# Patient Record
Sex: FEMALE | URBAN - METROPOLITAN AREA
[De-identification: names, ages, dates, MRNs, and addresses within clinical notes are randomized per-mention and may not be internally consistent; named-entity substitution may affect disease eponyms.]

---

## 2019-02-12 ENCOUNTER — HOSPITAL ENCOUNTER (OUTPATIENT)
Dept: LAB | Age: 69
Discharge: HOME | End: 2019-02-12
Payer: MEDICARE

## 2019-02-12 PROCEDURE — 88305 TISSUE EXAM BY PATHOLOGIST: CPT

## 2022-12-07 ENCOUNTER — OFFICE VISIT (OUTPATIENT)
Dept: HEMATOLOGY | Age: 72
End: 2022-12-07
Payer: MEDICARE

## 2022-12-07 ENCOUNTER — HOSPITAL ENCOUNTER (OUTPATIENT)
Dept: LAB | Age: 72
Discharge: HOME | End: 2022-12-07
Payer: MEDICARE

## 2022-12-07 VITALS
HEART RATE: 92 BPM | BODY MASS INDEX: 41.32 KG/M2 | WEIGHT: 263.25 LBS | DIASTOLIC BLOOD PRESSURE: 105 MMHG | OXYGEN SATURATION: 98 % | HEIGHT: 67 IN | SYSTOLIC BLOOD PRESSURE: 169 MMHG | TEMPERATURE: 95.3 F

## 2022-12-07 DIAGNOSIS — B18.2 CHRONIC HEPATITIS C WITH HEPATIC COMA (HCC): ICD-10-CM

## 2022-12-07 DIAGNOSIS — Z11.59 ENCOUNTER FOR SCREENING FOR OTHER VIRAL DISEASES: ICD-10-CM

## 2022-12-07 DIAGNOSIS — B18.2 CHRONIC HEPATITIS C WITH HEPATIC COMA (HCC): Primary | ICD-10-CM

## 2022-12-07 PROBLEM — I10 HYPERTENSION: Status: ACTIVE | Noted: 2022-12-07

## 2022-12-07 PROBLEM — E11.9 TYPE II DIABETES MELLITUS (HCC): Status: ACTIVE | Noted: 2022-12-07

## 2022-12-07 PROBLEM — Z86.73 HISTORY OF CVA (CEREBROVASCULAR ACCIDENT): Status: ACTIVE | Noted: 2022-12-07

## 2022-12-07 LAB
ALBUMIN SERPL-MCNC: 3.7 G/DL (ref 3.4–5)
ALBUMIN/GLOB SERPL: 0.8 {RATIO} (ref 0.8–1.7)
ALP SERPL-CCNC: 121 U/L (ref 45–117)
ALT SERPL-CCNC: 123 U/L (ref 13–56)
ANION GAP SERPL CALC-SCNC: 4 MMOL/L (ref 3–18)
AST SERPL-CCNC: 112 U/L (ref 10–38)
BASOPHILS # BLD: 0 K/UL (ref 0–0.1)
BASOPHILS NFR BLD: 1 % (ref 0–2)
BILIRUB DIRECT SERPL-MCNC: 0.2 MG/DL (ref 0–0.2)
BILIRUB SERPL-MCNC: 0.5 MG/DL (ref 0.2–1)
BUN SERPL-MCNC: 23 MG/DL (ref 7–18)
BUN/CREAT SERPL: 19 (ref 12–20)
CALCIUM SERPL-MCNC: 10.5 MG/DL (ref 8.5–10.1)
CHLORIDE SERPL-SCNC: 106 MMOL/L (ref 100–111)
CO2 SERPL-SCNC: 28 MMOL/L (ref 21–32)
CREAT SERPL-MCNC: 1.18 MG/DL (ref 0.6–1.3)
DIFFERENTIAL METHOD BLD: ABNORMAL
EOSINOPHIL # BLD: 0.3 K/UL (ref 0–0.4)
EOSINOPHIL NFR BLD: 5 % (ref 0–5)
ERYTHROCYTE [DISTWIDTH] IN BLOOD BY AUTOMATED COUNT: 13.2 % (ref 11.6–14.5)
GLOBULIN SER CALC-MCNC: 4.8 G/DL (ref 2–4)
GLUCOSE SERPL-MCNC: 154 MG/DL (ref 74–99)
HCT VFR BLD AUTO: 41.6 % (ref 35–45)
HGB BLD-MCNC: 13.7 G/DL (ref 12–16)
IMM GRANULOCYTES # BLD AUTO: 0 K/UL (ref 0–0.04)
IMM GRANULOCYTES NFR BLD AUTO: 1 % (ref 0–0.5)
LYMPHOCYTES # BLD: 1.9 K/UL (ref 0.9–3.6)
LYMPHOCYTES NFR BLD: 29 % (ref 21–52)
MCH RBC QN AUTO: 30.9 PG (ref 24–34)
MCHC RBC AUTO-ENTMCNC: 32.9 G/DL (ref 31–37)
MCV RBC AUTO: 93.9 FL (ref 78–100)
MONOCYTES # BLD: 0.6 K/UL (ref 0.05–1.2)
MONOCYTES NFR BLD: 9 % (ref 3–10)
NEUTS SEG # BLD: 3.8 K/UL (ref 1.8–8)
NEUTS SEG NFR BLD: 56 % (ref 40–73)
NRBC # BLD: 0 K/UL (ref 0–0.01)
NRBC BLD-RTO: 0 PER 100 WBC
PLATELET # BLD AUTO: 234 K/UL (ref 135–420)
PMV BLD AUTO: 12.1 FL (ref 9.2–11.8)
POTASSIUM SERPL-SCNC: 4.4 MMOL/L (ref 3.5–5.5)
PROT SERPL-MCNC: 8.5 G/DL (ref 6.4–8.2)
RBC # BLD AUTO: 4.43 M/UL (ref 4.2–5.3)
SODIUM SERPL-SCNC: 138 MMOL/L (ref 136–145)
WBC # BLD AUTO: 6.7 K/UL (ref 4.6–13.2)

## 2022-12-07 PROCEDURE — 80076 HEPATIC FUNCTION PANEL: CPT

## 2022-12-07 PROCEDURE — 80048 BASIC METABOLIC PNL TOTAL CA: CPT

## 2022-12-07 PROCEDURE — 36415 COLL VENOUS BLD VENIPUNCTURE: CPT

## 2022-12-07 PROCEDURE — 85025 COMPLETE CBC W/AUTO DIFF WBC: CPT

## 2022-12-07 PROCEDURE — 87522 HEPATITIS C REVRS TRNSCRPJ: CPT

## 2022-12-07 PROCEDURE — 87340 HEPATITIS B SURFACE AG IA: CPT

## 2022-12-07 PROCEDURE — 86803 HEPATITIS C AB TEST: CPT

## 2022-12-07 PROCEDURE — 86706 HEP B SURFACE ANTIBODY: CPT

## 2022-12-07 PROCEDURE — 87902 NFCT AGT GNTYP ALYS HEP C: CPT

## 2022-12-07 PROCEDURE — 86704 HEP B CORE ANTIBODY TOTAL: CPT

## 2022-12-07 PROCEDURE — 86708 HEPATITIS A ANTIBODY: CPT

## 2022-12-07 RX ORDER — METFORMIN HYDROCHLORIDE 500 MG/1
500 TABLET ORAL
COMMUNITY

## 2022-12-07 RX ORDER — PANTOPRAZOLE SODIUM 40 MG/1
TABLET, DELAYED RELEASE ORAL
COMMUNITY
Start: 2022-09-27

## 2022-12-07 RX ORDER — METOPROLOL SUCCINATE 50 MG/1
TABLET, EXTENDED RELEASE ORAL
COMMUNITY
Start: 2022-09-27

## 2022-12-07 RX ORDER — POTASSIUM CHLORIDE 20 MEQ/1
TABLET, EXTENDED RELEASE ORAL
COMMUNITY
Start: 2022-08-23

## 2022-12-07 RX ORDER — AMLODIPINE BESYLATE 10 MG/1
TABLET ORAL
COMMUNITY
Start: 2022-09-07

## 2022-12-07 RX ORDER — ZINC GLUCONATE 10 MG
LOZENGE ORAL
COMMUNITY

## 2022-12-07 RX ORDER — OLMESARTAN MEDOXOMIL AND HYDROCHLOROTHIAZIDE 40/25 40; 25 MG/1; MG/1
1 TABLET ORAL DAILY
COMMUNITY

## 2022-12-07 NOTE — PROGRESS NOTES
[address] [facility], MD, [address], [place], [place]      TRISH Kemp, [facility]   Amirah Del Castillo, [facility]   Xenia Thompson, FNMANJEET-CHIO Fields FNP-CHIO Hernandez, [facility]      [address]   at [address], [facility] Cassie Putnam  22.   [phone]   FAX: [address]   at [address], [address]   [phone]   FAX: [phone]       Patient Care Team:  Elijah Nelson DO as PCP - General (Internal Medicine Physician)  [place], [place] as Referring Provider (Physician Assistant)      Problem List  Date Reviewed: 12/7/2022            Codes Class Noted    Chronic hepatitis [facility] ([facility] ICD-10-CM: B18.2  ICD-9-CM: 070.54  12/7/2022        Hypertension ICD-10-CM: I10  ICD-9-CM: 401.9  12/7/2022        Type II diabetes mellitus ([facility] ICD-10-CM: E11.9  ICD-9-CM: 250.00  12/7/2022        History of CVA (cerebrovascular accident) ICD-10-CM: Z86.73  ICD-9-CM: V12.54  12/7/2022           The clinicians listed above have asked me to see Antonio Fay in consultation regarding chronic HCV and its management. All medical records sent by the referring physicians were reviewed including imaging studies     The patient is a 66 y.o. Black female who was found to have abnormalities in liver chemistries and subsequently tested positive for chronic HCV during birth cohort screening in 8/2022. Risk factors for acquiring HCV are IV drug use in 1970s. There was no history of acute icteric hepatitis at the time of these risk factors. Ultrasound of the liver was performed at [facility]. The results of the imaging are not available at this time. An assessment of liver fibrosis with biopsy or elastography has not been performed.       The patient has never received treatment for chronic HCV. The patient does not have any symptoms which could be attributed to the liver disorder. The patient is not experiencing the following symptoms which are commonly seen in this liver disorder:   fatigue,   pain in the right side over the liver,     The patient completes all daily activities without any functional limitations. ASSESSMENT AND PLAN:  Chronic HCV   Chronic HCV of unclear severity. Will perform laboratory testing to monitor liver function and degree of liver injury. This included BMP, hepatic panel, CBC with platelet count,   Liver transaminases are elevated. ALP is elevated. Liver function is normal.  The platelet count is normal.      Will perform and/or review results of HCV viral load, HCV genotype to define the specific treatment and duration of treatment that will be required. Will perform serologic and virologic studies to assess for other causes of chronic liver disease. Will obtain imaging of the liver with ultrasound performed at [facility].    The need to perform an assessment of liver fibrosis was discussed with the patient. The Fibroscan can assess liver fibrosis and determine if a patient has advanced fibrosis or cirrhosis without the need for liver biopsy. This will be performed at the next office visit. Chronic HCV Treatment  The patient has not been treated for HCV. The patient has HCV genotype 1A. Discussed the treatment alternatives. The SVR/cure rate for HCV now exceeds 97% without significant side effects for most patients with HCV. The specific treatment is dependent upon genotype, viral load and histology. The patient should be treated with Mavyret (glecaprevir and piprentasvir) or Epclusa (sofosbuvir and velpatasvir)   The SVR/cure rate for is over 95%. Screening for Hepatocellular Carcinoma  HCC screening is not necessary if the patient has no evidence of cirrhosis.     Treatment of other medical problems in patients with chronic liver disease  There are no contraindications for the patient to take most medications that are necessary for treatment of other medical issues. Counseling for alcohol in patients with chronic liver disease  The patient was counseled regarding alcohol consumption and the effect of alcohol on chronic liver disease. The patient does not consume any significant amount of alcohol. Vaccinations   Vaccination for viral hepatitis A is recommended since the patient has no serologic evidence of previous exposure or vaccination with immunity. Routine vaccinations against other bacterial and viral agents can be performed as indicated. Annual flu vaccination should be administered if indicated. ALLERGIES  Not on File    MEDICATIONS  Current Outpatient Medications   Medication Sig    amLODIPine (NORVASC) 10 mg tablet     apixaban (ELIQUIS) 5 mg tablet     cyanocobalamin 50 mcg tablet Take 50 mcg by mouth daily. metFORMIN (GLUCOPHAGE) 500 mg tablet Take 500 mg by mouth.    metoprolol succinate (TOPROL-XL) 50 mg XL tablet     olmesartan-hydroCHLOROthiazide (BENICAR HCT) 40-25 mg per tablet Take 1 Tablet by mouth daily. pantoprazole (PROTONIX) 40 mg tablet     potassium chloride (K-DUR, KLOR-CON M20) 20 mEq tablet     magnesium 250 mg tab Take  by mouth. No current facility-administered medications for this visit. SYSTEM REVIEW NOT RELATED TO LIVER DISEASE OR REVIEWED ABOVE:  Constitution systems: Negative for fever, chills, weight gain, weight loss. Eyes: Negative for visual changes. ENT: Negative for sore throat, painful swallowing. Respiratory: Negative for cough, hemoptysis, SOB. Cardiology: Negative for chest pain, palpitations. GI:  Negative for constipation or diarrhea. : Negative for urinary frequency, dysuria, hematuria, nocturia. Skin: Negative for rash. Hematology: Negative for easy bruising, blood clots.     Musculo-skelatal: Negative for back pain, muscle pain, weakness. Neurologic: Negative for headaches, dizziness, vertigo, memory problems not related to HE. Psychology: Negative for anxiety, depression. FAMILY HISTORY:  The father  of MI. The mother  of DM. There is no family history of liver disease. SOCIAL HISTORY:  The patient has never been . The patient has 1 child who is  and 2 grandchildren, 1 of whom is   The patient stopped using tobacco products in 2022. The patient has never consumed significant amounts of alcohol. The patient used to work as a . PHYSICAL EXAMINATION:  Visit Vitals  BP (!) 169/105   Pulse 92   Temp (!) 95.3 °F (35.2 °C) (Tympanic)   Ht 5' 7\" (1.702 m)   Wt 263 lb 4 oz (119.4 kg)   SpO2 98%   BMI 41.23 kg/m²     General: No acute distress. Eyes: Sclera anicteric. ENT: No oral lesions. Thyroid normal.  Nodes: No adenopathy. Skin: No spider angiomata. No jaundice. No palmar erythema. Respiratory: Lungs clear to auscultation. Cardiovascular: Regular heart rate. No murmurs. No JVD. Abdomen: Soft non-tender. Liver size normal to percussion/palpation. Spleen not palpable. No obvious ascites. Extremities: No edema. No muscle wasting. No gross arthritic changes. Neurologic: Alert and oriented. Cranial nerves grossly intact. No asterixis.     LABORATORY STUDIES:  Liver [place] of [address] Units 2022   WBC 4.6 - 13.2 K/uL 6.7   ANC 1.8 - 8.0 K/UL 3.8   HGB 12.0 - 16.0 g/dL 13.7    - 420 K/uL 234   AST 10 - 38 U/L 112 (H)   ALT 13 - 56 U/L 123 (H)   Alk Phos 45 - 117 U/L 121 (H)   Bili, Total 0.2 - 1.0 MG/DL 0.5   Bili, Direct 0.0 - 0.2 MG/DL 0.2   Albumin 3.4 - 5.0 g/dL 3.7   BUN 7.0 - 18 MG/DL 23 (H)   Creat 0.6 - 1.3 MG/DL 1.18   Na 136 - 145 mmol/L 138   K 3.5 - 5.5 mmol/L 4.4   Cl 100 - 111 mmol/L 106   CO2 21 - 32 mmol/L 28   Glucose 74 - 99 mg/dL 154 (H)       SEROLOGIES:  Serologies Latest Ref Rng & Units 12/7/2022   Hep A Ab, Total Negative   Negative   Hep B Surface Ag <1.00 Index 0.53   Hep B Surface Ag Interp NEG   Negative   Hep B Core Ab, Total Negative   Negative   Hep B Surface Ab >10.0 mIU/mL <3.10 (L)   Hep B Surface Ab Interp POS   Negative (A)   Hep C Ab 0.0 - 0.9 s/co ratio >11.0 (H)   Hep C Genotype  1a     12/2022. HCV RNA Log 6.4 intU    LIVER HISTOLOGY:  Not available or performed    ENDOSCOPIC PROCEDURES:  Not available or performed    RADIOLOGY:  Not available or performed    OTHER TESTING:  Not available or performed    FOLLOW-UP:  All of the issues listed above in the Assessment and Plan were discussed with the patient. All questions were answered. The patient expressed a clear understanding of the above. [facility] in 4 weeks for Fibroscan and to initiate HCV treatment.       Tacos Mera MD  [address], [address] [place] [address] Street [address] [phone]  FAX: [address] [place] [address]

## 2022-12-07 NOTE — Clinical Note
12/25/2022    Patient: Mic Bronw   YOB: 1950   Date of Visit: 12/7/2022     Luz Dallas DO  [address] [address]  Via Fax: [phone]    Dear Luz Dallas DO,      Thank you for referring Ms. Mic Brown to [address] [place] [address],[address] for evaluation. My notes for this consultation are attached. If you have questions, please do not hesitate to call me. I look forward to following your patient along with you.       Sincerely,    Dunia Gamez MD

## 2022-12-08 LAB
HAV AB SER QL IA: NEGATIVE
HBV CORE AB SERPL QL IA: NEGATIVE
HBV SURFACE AB SER QL IA: NEGATIVE
HBV SURFACE AB SERPL IA-ACNC: <3.1 MIU/ML
HBV SURFACE AG SER QL: 0.53 INDEX
HBV SURFACE AG SER QL: NEGATIVE
HEP BS AB COMMENT,HBSAC: ABNORMAL

## 2022-12-09 LAB
HCV AB S/CO SERPL IA: >11 S/CO RATIO (ref 0–0.9)
HCV GENTYP SERPL NAA+PROBE: NORMAL
HCV RNA SERPL NAA+PROBE-ACNC: NORMAL IU/ML
HCV RNA SERPL NAA+PROBE-LOG IU: 6.41 LOG10 IU/ML
INTERPRETATION, 550396: NORMAL
PLEASE NOTE, 550474: NORMAL
REF LAB TEST REF RANGE: NORMAL

## 2023-03-20 ENCOUNTER — OFFICE VISIT (OUTPATIENT)
Age: 73
End: 2023-03-20
Payer: MEDICARE

## 2023-03-20 VITALS
HEIGHT: 67 IN | TEMPERATURE: 96.6 F | SYSTOLIC BLOOD PRESSURE: 160 MMHG | OXYGEN SATURATION: 98 % | HEART RATE: 107 BPM | WEIGHT: 263 LBS | BODY MASS INDEX: 41.28 KG/M2 | DIASTOLIC BLOOD PRESSURE: 111 MMHG

## 2023-03-20 DIAGNOSIS — B18.2 CHRONIC HEPATITIS C WITHOUT HEPATIC COMA (HCC): Primary | ICD-10-CM

## 2023-03-20 PROCEDURE — 99214 OFFICE O/P EST MOD 30 MIN: CPT | Performed by: NURSE PRACTITIONER

## 2023-03-20 PROCEDURE — G8428 CUR MEDS NOT DOCUMENT: HCPCS | Performed by: NURSE PRACTITIONER

## 2023-03-20 PROCEDURE — 4004F PT TOBACCO SCREEN RCVD TLK: CPT | Performed by: NURSE PRACTITIONER

## 2023-03-20 PROCEDURE — G8484 FLU IMMUNIZE NO ADMIN: HCPCS | Performed by: NURSE PRACTITIONER

## 2023-03-20 PROCEDURE — 3017F COLORECTAL CA SCREEN DOC REV: CPT | Performed by: NURSE PRACTITIONER

## 2023-03-20 PROCEDURE — G8400 PT W/DXA NO RESULTS DOC: HCPCS | Performed by: NURSE PRACTITIONER

## 2023-03-20 PROCEDURE — 91200 LIVER ELASTOGRAPHY: CPT | Performed by: NURSE PRACTITIONER

## 2023-03-20 PROCEDURE — 3078F DIAST BP <80 MM HG: CPT | Performed by: NURSE PRACTITIONER

## 2023-03-20 PROCEDURE — 3074F SYST BP LT 130 MM HG: CPT | Performed by: NURSE PRACTITIONER

## 2023-03-20 PROCEDURE — 1123F ACP DISCUSS/DSCN MKR DOCD: CPT | Performed by: NURSE PRACTITIONER

## 2023-03-20 PROCEDURE — G8417 CALC BMI ABV UP PARAM F/U: HCPCS | Performed by: NURSE PRACTITIONER

## 2023-03-20 PROCEDURE — 1090F PRES/ABSN URINE INCON ASSESS: CPT | Performed by: NURSE PRACTITIONER

## 2023-03-20 ASSESSMENT — PATIENT HEALTH QUESTIONNAIRE - PHQ9
SUM OF ALL RESPONSES TO PHQ QUESTIONS 1-9: 0
SUM OF ALL RESPONSES TO PHQ QUESTIONS 1-9: 0
2. FEELING DOWN, DEPRESSED OR HOPELESS: 0
1. LITTLE INTEREST OR PLEASURE IN DOING THINGS: 0
SUM OF ALL RESPONSES TO PHQ9 QUESTIONS 1 & 2: 0
SUM OF ALL RESPONSES TO PHQ QUESTIONS 1-9: 0
SUM OF ALL RESPONSES TO PHQ QUESTIONS 1-9: 0

## 2023-03-20 NOTE — PROGRESS NOTES
issues. Counseling for alcohol in patients with chronic liver disease  The patient was counseled regarding alcohol consumption and the effect of alcohol on chronic liver disease. The patient does not consume any significant amount of alcohol. Vaccinations   Vaccination for viral hepatitis A is recommended since the patient has no serologic evidence of previous exposure or vaccination with immunity. Routine vaccinations against other bacterial and viral agents can be performed as indicated. Annual flu vaccination should be administered if indicated. ALLERGIES  No Known Allergies    MEDICATIONS  Current Outpatient Medications   Medication Sig Dispense Refill    glecaprevir-pibrentasvir (MAVYRET) 100-40 MG TABS tablet Take 1 each by mouth daily 84 tablet 1    amLODIPine (NORVASC) 10 MG tablet ceived the following from [facility]: Outside name: amLODIPine (NORVASC) 10 mg tablet      apixaban (ELIQUIS) 5 MG TABS tablet ceived the following from [facility]: Outside name: apixaban (ELIQUIS) 5 mg tablet      cyanocobalamin 50 MCG tablet Take 50 mcg by mouth daily      metFORMIN (GLUCOPHAGE) 500 MG tablet Take 500 mg by mouth      metoprolol succinate (TOPROL XL) 50 MG extended release tablet ceived the following from [facility]: Outside name: metoprolol succinate (TOPROL-XL) 50 mg XL tablet      olmesartan-hydroCHLOROthiazide (BENICAR HCT) 40-25 MG per tablet Take 1 tablet by mouth daily      pantoprazole (PROTONIX) 40 MG tablet ceived the following from [facility]: Outside name: pantoprazole (PROTONIX) 40 mg tablet      potassium chloride (KLOR-CON M) 20 MEQ extended release tablet ceived the following from [facility]: Outside name: potassium chloride (K-DUR, KLOR-CON M20) 20 mEq tablet       No current facility-administered medications for this visit.        SYSTEM REVIEW NOT RELATED TO LIVER DISEASE OR REVIEWED

## 2023-03-23 RX ORDER — VELPATASVIR AND SOFOSBUVIR 100; 400 MG/1; MG/1
1 TABLET, FILM COATED ORAL DAILY
Qty: 28 TABLET | Refills: 2 | Status: ACTIVE | OUTPATIENT
Start: 2023-03-23